# Patient Record
Sex: MALE | Race: WHITE | HISPANIC OR LATINO | Employment: STUDENT | URBAN - METROPOLITAN AREA
[De-identification: names, ages, dates, MRNs, and addresses within clinical notes are randomized per-mention and may not be internally consistent; named-entity substitution may affect disease eponyms.]

---
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
## 2023-02-12 ENCOUNTER — HOSPITAL ENCOUNTER (EMERGENCY)
Facility: HOSPITAL | Age: 18
Discharge: HOME | End: 2023-02-12
Attending: EMERGENCY MEDICINE

## 2023-02-12 VITALS
RESPIRATION RATE: 18 BRPM | TEMPERATURE: 98 F | WEIGHT: 200 LBS | SYSTOLIC BLOOD PRESSURE: 119 MMHG | BODY MASS INDEX: 33.32 KG/M2 | DIASTOLIC BLOOD PRESSURE: 58 MMHG | HEIGHT: 65 IN | OXYGEN SATURATION: 97 % | HEART RATE: 82 BPM

## 2023-02-12 DIAGNOSIS — H10.212 CHEMICAL CONJUNCTIVITIS OF LEFT EYE: Primary | ICD-10-CM

## 2023-02-12 PROCEDURE — 25000003 PHARM REV CODE 250: Performed by: EMERGENCY MEDICINE

## 2023-02-12 PROCEDURE — 99284 EMERGENCY DEPT VISIT MOD MDM: CPT

## 2023-02-12 RX ORDER — TETRACAINE HYDROCHLORIDE 5 MG/ML
2 SOLUTION OPHTHALMIC
Status: COMPLETED | OUTPATIENT
Start: 2023-02-12 | End: 2023-02-12

## 2023-02-12 RX ORDER — HYDROCODONE BITARTRATE AND ACETAMINOPHEN 5; 325 MG/1; MG/1
2 TABLET ORAL
Status: COMPLETED | OUTPATIENT
Start: 2023-02-12 | End: 2023-02-12

## 2023-02-12 RX ORDER — ERYTHROMYCIN 5 MG/G
OINTMENT OPHTHALMIC
Status: COMPLETED | OUTPATIENT
Start: 2023-02-12 | End: 2023-02-12

## 2023-02-12 RX ORDER — ERYTHROMYCIN 5 MG/G
OINTMENT OPHTHALMIC
Qty: 1 G | Refills: 0 | Status: SHIPPED | OUTPATIENT
Start: 2023-02-12

## 2023-02-12 RX ORDER — HYDROCODONE BITARTRATE AND ACETAMINOPHEN 5; 325 MG/1; MG/1
1 TABLET ORAL EVERY 6 HOURS PRN
Qty: 6 TABLET | Refills: 0 | Status: SHIPPED | OUTPATIENT
Start: 2023-02-12

## 2023-02-12 RX ADMIN — TETRACAINE HYDROCHLORIDE 2 DROP: 5 SOLUTION OPHTHALMIC at 09:02

## 2023-02-12 RX ADMIN — FLUORESCEIN SODIUM 1 EACH: 1 STRIP OPHTHALMIC at 09:02

## 2023-02-12 RX ADMIN — HYDROCODONE BITARTRATE AND ACETAMINOPHEN 2 TABLET: 5; 325 TABLET ORAL at 09:02

## 2023-02-12 RX ADMIN — ERYTHROMYCIN 1 INCH: 5 OINTMENT OPHTHALMIC at 09:02

## 2023-02-13 NOTE — DISCHARGE INSTRUCTIONS
Please return immediately if he gets worse or if new problems develop.  Please use the erythromycin ointment in the left eye, every 6 hours for 24 hours.  Tylenol, 1000 mg by mouth every 8 hours as needed for pain or Tylenol with hydrocodone for pain.  Please follow-up with the optometrist above tomorrow.

## 2023-02-13 NOTE — ED PROVIDER NOTES
Encounter Date: 2/12/2023       History     Chief Complaint   Patient presents with    bleach in eye     Household bleach in left eye at about 1 pm, pain is getting worse hurts to open eye. Redness and yellow drainage noted     HPI  This 18-year-old  male presents emergency room after getting household  bleach splashed into his left eye at 1:00 p.m..  The patient continues to have pain and burning in the eye.  He notes redness.  He reports yellow drainage.  He reports blurred vision.  He is without other injuries or problems.  There is no foreign body sensation.  Review of patient's allergies indicates:  No Known Allergies  No past medical history on file.  No past surgical history on file.  No family history on file.  Social History     Tobacco Use    Smoking status: Some Days     Types: Cigarettes   Substance Use Topics    Alcohol use: Not Currently    Drug use: Yes     Types: Marijuana     Review of Systems  The patient was questioned specifically with regard to the following.  General: Fever, chills, sweats. Neuro: Headache. Eyes: eye problems. ENT: Ear pain, sore throat. Cardiovascular: Chest pain. Respiratory: Cough, shortness of breath. Gastrointestinal: Abdominal pain, vomiting, diarrhea. Genitourinary: Painful urination.  Musculoskeletal: Arm and leg problems. Skin: Rash.  The review of systems was negative except for the following:  Bleach in the left eye, pain, redness, blurred vision.  Physical Exam     Initial Vitals [02/12/23 2037]   BP Pulse Resp Temp SpO2   136/77 89 20 98 °F (36.7 °C) 100 %      MAP       --         Physical Exam  The patient was examined specifically for the following:   General:No significant distress, Good color, Warm and dry. Head and neck:Scalp atraumatic, Neck supple. Neurological:Appropriate conversation, Gross motor deficits. Eyes:Conjugate gaze, Clear corneas. ENT: No epistaxis. Cardiac: Regular rate and rhythm, Grossly normal heart tones. Pulmonary:  Wheezing, Rales. Gastrointestinal: Abdominal tenderness, Abdominal distention. Musculoskeletal: Extremity deformity, Apparent pain with range of motion of the joints. Skin: Rash.   The findings on examination were normal except for the following:  The patient has injection of the left conjunctiva.  No foreign bodies are appreciated.  The cornea is normal grossly.  There is no yellow eye discharge.  Globe is intact.  Wood's lamp examination fails to reveal any fluorescein uptake.  The cornea is clear.  The anterior chamber is clear.  Chocolate movements are normal.  The conjunctiva is injected.  Procedures  Labs Reviewed - No data to display       Imaging Results    None       Medical decision making:  Given the above, this patient presents emergency room with an exposure to bleach in the left eye.  The pH is 7.0.  There is no fluorescein uptake of the left cornea.  I will discharge this patient to outpatient evaluation and treatment.  The pH is normal.  He should recover.  There is no evidence of penetrating trauma.  I see no keratitis.  There is no foreign body on examination.         Medications   TETRAcaine HCl (PF) 0.5 % Drop 2 drop (2 drops Left Eye Given by Other 2/12/23 2128)   fluorescein ophthalmic strip 1 each (1 each Left Eye Given by Other 2/12/23 2128)   erythromycin 5 mg/gram (0.5 %) ophthalmic ointment (1 inch Left Eye Given by Other 2/12/23 2128)   HYDROcodone-acetaminophen 5-325 mg per tablet 2 tablet (2 tablets Oral Given 2/12/23 2127)                              Clinical Impression:   Final diagnoses:  [H10.212] Chemical conjunctivitis of left eye (Primary)        ED Disposition Condition    Discharge Stable          ED Prescriptions       Medication Sig Dispense Start Date End Date Auth. Provider    HYDROcodone-acetaminophen (NORCO) 5-325 mg per tablet Take 1 tablet by mouth every 6 (six) hours as needed for Pain. 6 tablet 2/12/2023 -- Rom Garcia MD    erythromycin (ROMYCIN) ophthalmic  ointment Place a 1/2 inch ribbon of ointment into the lower eyelid, every 6 hours for 1 day. 1 g 2/12/2023 -- Rom Garcia MD          Follow-up Information       Follow up With Specialties Details Why Contact Info    [facility], [facility] In 1 day  [address] [facility] [address]  [phone]               Rom Garcia MD  02/12/23 7505

## 2023-09-29 ENCOUNTER — HOSPITAL ENCOUNTER (EMERGENCY)
Facility: HOSPITAL | Age: 18
Discharge: HOME | End: 2023-09-29
Attending: EMERGENCY MEDICINE

## 2023-09-29 VITALS
TEMPERATURE: 98 F | RESPIRATION RATE: 20 BRPM | SYSTOLIC BLOOD PRESSURE: 117 MMHG | DIASTOLIC BLOOD PRESSURE: 79 MMHG | HEART RATE: 64 BPM | BODY MASS INDEX: 31.65 KG/M2 | WEIGHT: 190 LBS | OXYGEN SATURATION: 97 % | HEIGHT: 65 IN

## 2023-09-29 DIAGNOSIS — R21 RASH AND NONSPECIFIC SKIN ERUPTION: Primary | ICD-10-CM

## 2023-09-29 LAB
CTP QC/QA: YES
MOLECULAR STREP A: NEGATIVE

## 2023-09-29 PROCEDURE — 99283 EMERGENCY DEPT VISIT LOW MDM: CPT

## 2023-09-29 PROCEDURE — 87651 STREP A DNA AMP PROBE: CPT

## 2023-09-29 RX ORDER — HYDROCORTISONE 25 MG/G
CREAM TOPICAL 2 TIMES DAILY
Qty: 28 G | Refills: 0 | Status: SHIPPED | OUTPATIENT
Start: 2023-09-29 | End: 2023-10-04

## 2023-09-29 RX ORDER — CETIRIZINE HYDROCHLORIDE 10 MG/1
10 TABLET ORAL DAILY
Qty: 30 TABLET | Refills: 0 | Status: SHIPPED | OUTPATIENT
Start: 2023-09-29 | End: 2023-10-29

## 2023-09-29 NOTE — ED PROVIDER NOTES
Encounter Date: 9/29/2023       History     Chief Complaint   Patient presents with    Rash     Pt to ER with reports of generalized rash x 1 week. Pt denies new lotions, soaps, foods, medication. + itching and burning      18-year-old male without significant past medical history presents emergency room today for evaluation of rash.  Rash has been present for 1 week.  It is primarily on the elbows and popliteal regions bilaterally.  No intraoral lesions.  No fevers.  No other complaints today.  He has tried calamine lotion with minimal relief.  He is not tried any oral medications.  No history of psoriasis, eczema.        Review of patient's allergies indicates:  No Known Allergies  History reviewed. No pertinent past medical history.  History reviewed. No pertinent surgical history.  History reviewed. No pertinent family history.  Social History     Tobacco Use    Smoking status: Some Days     Types: Cigarettes   Substance Use Topics    Alcohol use: Not Currently    Drug use: Yes     Types: Marijuana     Review of Systems   Constitutional:  Negative for chills, fatigue and fever.   HENT:  Negative for congestion, hearing loss, sore throat and trouble swallowing.    Eyes:  Negative for visual disturbance.   Respiratory:  Negative for cough, chest tightness and shortness of breath.    Cardiovascular:  Negative for chest pain.   Gastrointestinal:  Negative for abdominal pain and nausea.   Endocrine: Negative for polyuria.   Genitourinary:  Negative for difficulty urinating.   Musculoskeletal:  Negative for arthralgias and myalgias.   Skin:  Positive for rash.   Neurological:  Negative for dizziness and headaches.   Psychiatric/Behavioral:  The patient is not nervous/anxious.        Physical Exam     Initial Vitals [09/29/23 1351]   BP Pulse Resp Temp SpO2   117/79 64 20 98.1 °F (36.7 °C) 97 %      MAP       --         Physical Exam    Nursing note and vitals reviewed.  Constitutional: He appears well-developed and  well-nourished.   HENT:   Head: Normocephalic and atraumatic.   Mildly erythematous posterior oropharynx.  ENT exam otherwise unremarkable.   Eyes: Conjunctivae and EOM are normal.   Neck: Neck supple.   Cardiovascular:  Intact distal pulses.           Pulmonary/Chest: No respiratory distress.   Musculoskeletal:         General: Normal range of motion.      Cervical back: Neck supple.     Neurological: He is alert and oriented to person, place, and time. GCS score is 15. GCS eye subscore is 4. GCS verbal subscore is 5. GCS motor subscore is 6.   Skin: Skin is warm and dry. Capillary refill takes less than 2 seconds.   Hyperpigmented papular rash to the bilateral elbows, bilateral popliteal regions.  No evidence of secondary bacterial etiology.  No intraoral lesions.  No palmar or plantar lesions.   Psychiatric: He has a normal mood and affect. His behavior is normal. Judgment and thought content normal.         ED Course   Procedures  Labs Reviewed   POCT STREP A MOLECULAR          Imaging Results    None          Medications - No data to display  Medical Decision Making  18-year-old male presents emergency room today for evaluation of rash.  He is nontoxic, well-appearing and in no acute distress.  He is afebrile.  He has no systemic signs of illness.  Rash is hyperpigmented and papular, suspect eczema versus early psoriatic etiology.  Mildly erythematous posterior oropharynx.  Strep POCT performed and is negative.  Will initiate on Benadryl and topical steroid.  Referral placed for Dermatology.  Patient is discharged home in stable condition.        Amount and/or Complexity of Data Reviewed  Labs: ordered.    Risk  OTC drugs.  Prescription drug management.                               Clinical Impression:   Final diagnoses:  [R21] Rash and nonspecific skin eruption (Primary)        ED Disposition Condition    Discharge Stable          ED Prescriptions       Medication Sig Dispense Start Date End Date Auth.  Provider    cetirizine (ZYRTEC) 10 MG tablet Take 1 tablet (10 mg total) by mouth once daily. 30 tablet 9/29/2023 10/29/2023 Camilo Sosa PA-C    hydrocortisone 2.5 % cream Apply topically 2 (two) times daily. for 5 days 28 g 9/29/2023 10/4/2023 Camilo Sosa PA-C          Follow-up Information       Follow up With Specialties Details Why Contact Info    [facility] Emergency Dept Emergency Medicine Go to  As needed, If symptoms worsen [id number]  [facility] [id number] [phone]             Camilo Sosa PA-C  09/29/23 5231